# Patient Record
Sex: MALE | Race: BLACK OR AFRICAN AMERICAN | NOT HISPANIC OR LATINO | Employment: FULL TIME | RURAL
[De-identification: names, ages, dates, MRNs, and addresses within clinical notes are randomized per-mention and may not be internally consistent; named-entity substitution may affect disease eponyms.]

---

## 2020-03-19 ENCOUNTER — HISTORICAL (OUTPATIENT)
Dept: ADMINISTRATIVE | Facility: HOSPITAL | Age: 20
End: 2020-03-19

## 2021-03-26 ENCOUNTER — OFFICE VISIT (OUTPATIENT)
Dept: FAMILY MEDICINE | Facility: CLINIC | Age: 21
End: 2021-03-26
Payer: COMMERCIAL

## 2021-03-26 DIAGNOSIS — J30.1 SEASONAL ALLERGIC RHINITIS DUE TO POLLEN: ICD-10-CM

## 2021-03-26 DIAGNOSIS — Z13.220 SCREENING CHOLESTEROL LEVEL: Primary | ICD-10-CM

## 2021-03-26 DIAGNOSIS — I10 HYPERTENSION, UNSPECIFIED TYPE: ICD-10-CM

## 2021-03-26 PROCEDURE — 99213 OFFICE O/P EST LOW 20 MIN: CPT | Mod: GC,,, | Performed by: FAMILY MEDICINE

## 2021-03-26 PROCEDURE — 99213 PR OFFICE/OUTPT VISIT, EST, LEVL III, 20-29 MIN: ICD-10-PCS | Mod: GC,,, | Performed by: FAMILY MEDICINE

## 2021-03-26 RX ORDER — MONTELUKAST SODIUM 10 MG/1
10 TABLET ORAL NIGHTLY
Qty: 30 TABLET | Refills: 5 | Status: SHIPPED | OUTPATIENT
Start: 2021-03-26 | End: 2021-09-22

## 2021-03-26 RX ORDER — HYDROCHLOROTHIAZIDE 12.5 MG/1
12.5 TABLET ORAL 2 TIMES DAILY WITH MEALS
COMMUNITY
End: 2021-03-26

## 2021-03-26 RX ORDER — HYDROCHLOROTHIAZIDE 12.5 MG/1
12.5 TABLET ORAL 2 TIMES DAILY WITH MEALS
Qty: 60 TABLET | Refills: 6 | Status: SHIPPED | OUTPATIENT
Start: 2021-03-26 | End: 2022-09-12

## 2021-03-26 RX ORDER — MONTELUKAST SODIUM 10 MG/1
10 TABLET ORAL DAILY
COMMUNITY
End: 2021-03-26

## 2021-03-26 RX ORDER — HYDROCHLOROTHIAZIDE 12.5 MG/1
12.5 TABLET ORAL 2 TIMES DAILY WITH MEALS
Qty: 60 TABLET | Refills: 6 | Status: SHIPPED | OUTPATIENT
Start: 2021-03-26 | End: 2021-03-26

## 2021-05-21 ENCOUNTER — TELEPHONE (OUTPATIENT)
Dept: FAMILY MEDICINE | Facility: CLINIC | Age: 21
End: 2021-05-21

## 2021-05-23 ENCOUNTER — TELEPHONE (OUTPATIENT)
Dept: FAMILY MEDICINE | Facility: CLINIC | Age: 21
End: 2021-05-23

## 2022-09-12 ENCOUNTER — OFFICE VISIT (OUTPATIENT)
Dept: FAMILY MEDICINE | Facility: CLINIC | Age: 22
End: 2022-09-12

## 2022-09-12 VITALS
OXYGEN SATURATION: 98 % | WEIGHT: 225.81 LBS | HEART RATE: 81 BPM | RESPIRATION RATE: 20 BRPM | TEMPERATURE: 98 F | HEIGHT: 67 IN | BODY MASS INDEX: 35.44 KG/M2 | DIASTOLIC BLOOD PRESSURE: 128 MMHG | SYSTOLIC BLOOD PRESSURE: 167 MMHG

## 2022-09-12 DIAGNOSIS — Z20.2 POSSIBLE EXPOSURE TO STD: Primary | ICD-10-CM

## 2022-09-12 DIAGNOSIS — I10 HYPERTENSION, UNSPECIFIED TYPE: ICD-10-CM

## 2022-09-12 LAB
HIV 1+O+2 AB SERPL QL: NORMAL
SYPHILIS AB INTERPRETATION: REACTIVE

## 2022-09-12 PROCEDURE — 87389 HIV 1 / 2 ANTIBODY: ICD-10-PCS | Mod: ,,, | Performed by: CLINICAL MEDICAL LABORATORY

## 2022-09-12 PROCEDURE — 99214 PR OFFICE/OUTPT VISIT, EST, LEVL IV, 30-39 MIN: ICD-10-PCS | Mod: GC,,, | Performed by: FAMILY MEDICINE

## 2022-09-12 PROCEDURE — 87389 HIV-1 AG W/HIV-1&-2 AB AG IA: CPT | Mod: ,,, | Performed by: CLINICAL MEDICAL LABORATORY

## 2022-09-12 PROCEDURE — 86694 HERPES SIMPLEX 1 & 2 IGM: ICD-10-PCS | Mod: ,,, | Performed by: CLINICAL MEDICAL LABORATORY

## 2022-09-12 PROCEDURE — 86592 SYPHILIS TEST NON-TREP QUAL: CPT | Mod: ,,, | Performed by: CLINICAL MEDICAL LABORATORY

## 2022-09-12 PROCEDURE — 87591 N.GONORRHOEAE DNA AMP PROB: CPT | Mod: ,,, | Performed by: CLINICAL MEDICAL LABORATORY

## 2022-09-12 PROCEDURE — 87491 CHLMYD TRACH DNA AMP PROBE: CPT | Mod: ,,, | Performed by: CLINICAL MEDICAL LABORATORY

## 2022-09-12 PROCEDURE — 86694 HERPES SIMPLEX NES ANTBDY: CPT | Mod: ,,, | Performed by: CLINICAL MEDICAL LABORATORY

## 2022-09-12 PROCEDURE — 86780 TREPONEMA PALLIDUM (SYPHILIS) ANTIBODY: ICD-10-PCS | Mod: ,,, | Performed by: CLINICAL MEDICAL LABORATORY

## 2022-09-12 PROCEDURE — 87491 CHLAMYDIA/GONORRHOEAE(GC), PCR: ICD-10-PCS | Mod: ,,, | Performed by: CLINICAL MEDICAL LABORATORY

## 2022-09-12 PROCEDURE — 86592 RPR: ICD-10-PCS | Mod: ,,, | Performed by: CLINICAL MEDICAL LABORATORY

## 2022-09-12 PROCEDURE — 86780 TREPONEMA PALLIDUM: CPT | Mod: ,,, | Performed by: CLINICAL MEDICAL LABORATORY

## 2022-09-12 PROCEDURE — 87591 CHLAMYDIA/GONORRHOEAE(GC), PCR: ICD-10-PCS | Mod: ,,, | Performed by: CLINICAL MEDICAL LABORATORY

## 2022-09-12 PROCEDURE — 99214 OFFICE O/P EST MOD 30 MIN: CPT | Mod: GC,,, | Performed by: FAMILY MEDICINE

## 2022-09-12 RX ORDER — HYDROCHLOROTHIAZIDE 12.5 MG/1
12.5 TABLET ORAL DAILY
Qty: 30 TABLET | Refills: 11 | Status: SHIPPED | OUTPATIENT
Start: 2022-09-12 | End: 2023-03-27 | Stop reason: SDUPTHER

## 2022-09-12 NOTE — PROGRESS NOTES
Subjective:       Patient ID: Jonatan Ibarra is a 21 y.o. male.    Chief Complaint: Establish Care (Title X: STD Check)    This is a 21 year old male with Hx of HTN who presents today with penile shaft sores and concern for STD.  Today patient requested a Title X STD check. Patient states he has noticed some  painless small sores on his penis for the last few days to a week and he was concerned about a possible STD. PT reports he only has 1 female partner and they never had any STD issues. He also believes it could be from the new soap that he had been using over the past week because it possibly was causing some itching. Patient also noted to have elevated blood pressure and states he has not taken his blood pressure in a few months because he could not afford his medication.    STD test collected today Chlamydia/GC PCR, HIV 1/2 Ag/AB, HSV 1 and 2, Syphilis Antibody with RPR will call patient with results    HTN -re-ordered patients HCTZ and sent to [facility]. Without insurance medication is 4 dollars. Patient instructed to  medication today and start taking today.      Current Outpatient Medications:     hydroCHLOROthiazide (HYDRODIURIL) 12.5 MG Tab, Take 1 tablet (12.5 mg total) by mouth once daily., Disp: 30 tablet, Rfl: 11    Review of patient's allergies indicates:   Allergen Reactions    Shellfish containing products Rash and Shortness Of Breath       History reviewed. No pertinent past medical history.    History reviewed. No pertinent surgical history.    History reviewed. No pertinent family history.    Social History     Tobacco Use    Smoking status: Every Day     Types: Cigarettes    Smokeless tobacco: Never   Substance Use Topics    Alcohol use: Yes     Comment: occasionally       Review of Systems   Genitourinary:  Positive for genital sores.   All other systems reviewed and are negative.      Current Medications:   Medication List with Changes/Refills   New Medications     "HYDROCHLOROTHIAZIDE (HYDRODIURIL) 12.5 MG TAB    Take 1 tablet (12.5 mg total) by mouth once daily.       Start Date: 9/12/2022 End Date: 9/12/2023   Discontinued Medications    HYDROCHLOROTHIAZIDE (HYDRODIURIL) 12.5 MG TAB    Take 1 tablet (12.5 mg total) by mouth 2 (two) times daily with meals.       Start Date: 3/26/2021 End Date: 9/12/2022            Objective:        Vitals:    09/12/22 1112   BP: (!) 167/128   BP Location: Right arm   Patient Position: Sitting   BP Method: Medium (Automatic)   Pulse: 81   Resp: 20   Temp: 98.4 °F (36.9 °C)   TempSrc: Oral   SpO2: 98%   Weight: 102.4 kg (225 lb 12.8 oz)   Height: 5' 7" (1.702 m)       Physical Exam  Constitutional:       Appearance: Normal appearance.   HENT:      Head: Normocephalic.      Right Ear: Tympanic membrane normal.      Left Ear: Tympanic membrane normal.      Nose: Nose normal.      Mouth/Throat:      Mouth: Mucous membranes are moist.      Pharynx: Oropharynx is clear.   Eyes:      Conjunctiva/sclera: Conjunctivae normal.      Pupils: Pupils are equal, round, and reactive to light.   Cardiovascular:      Rate and Rhythm: Normal rate and regular rhythm.      Pulses: Normal pulses.      Heart sounds: Normal heart sounds.   Pulmonary:      Effort: Pulmonary effort is normal.      Breath sounds: Normal breath sounds.   Abdominal:      General: Abdomen is flat. Bowel sounds are normal.   Musculoskeletal:         General: Normal range of motion.      Cervical back: Normal range of motion.   Skin:     General: Skin is warm and dry.      Capillary Refill: Capillary refill takes less than 2 seconds.   Neurological:      General: No focal deficit present.      Mental Status: He is alert.   Psychiatric:         Mood and Affect: Mood normal.           No results found for: WBC, HGB, HCT, PLT, CHOL, TRIG, HDL, LDLDIRECT, ALT, AST, NA, K, CL, CREATININE, BUN, CO2, TSH, PSA, INR, GLUF, HGBA1C, MICROALBUR   Assessment:       1. Possible exposure to STD    2. " Hypertension, unspecified type          Plan:         Problem List Items Addressed This Visit          Cardiac/Vascular    Hypertension     -HydroCHLOROthiazide 12.5mg daily ordered. Prescription sent to [facility] system for affordable option.  -Patient told to monitor blood pressure daily and nightly                ID    Possible exposure to STD - Primary     -STD Test: Chlamydia/GC PCR, HIV 1/2 Ag/Ab, HSV 1 and 2, Syphilis Antibody with RPR    -Will call patient with results and treatment plan if needed.         Relevant Orders    Chlamydia/GC, PCR    HIV 1/2 Ag/Ab (4th Gen)    HSV 1 & 2, IgM    Syphilis Antibody with reflex to RPR         No follow-ups on file.    Macario Iraheta MD     Instructed patient that if symptoms fail to improve or worsen patient should seek immediate medical attention or report to the nearest emergency department. Patient expressed verbal agreement and understanding to this plan of care.

## 2022-09-12 NOTE — ASSESSMENT & PLAN NOTE
-STD Test: Chlamydia/GC PCR, HIV 1/2 Ag/Ab, HSV 1 and 2, Syphilis Antibody with RPR    -Will call patient with results and treatment plan if needed.

## 2022-09-12 NOTE — ASSESSMENT & PLAN NOTE
-HydroCHLOROthiazide 12.5mg daily ordered. Prescription sent to [facility] for affordable option.  -Patient told to monitor blood pressure daily and nightly

## 2022-09-13 LAB
CHLAMYDIA BY PCR: NEGATIVE
N. GONORRHOEAE (GC) BY PCR: NEGATIVE
RPR SER-TITR: NORMAL {TITER}

## 2022-09-14 ENCOUNTER — OFFICE VISIT (OUTPATIENT)
Dept: FAMILY MEDICINE | Facility: CLINIC | Age: 22
End: 2022-09-14

## 2022-09-14 VITALS
SYSTOLIC BLOOD PRESSURE: 161 MMHG | DIASTOLIC BLOOD PRESSURE: 108 MMHG | WEIGHT: 225 LBS | HEART RATE: 86 BPM | TEMPERATURE: 98 F | RESPIRATION RATE: 20 BRPM | BODY MASS INDEX: 35.31 KG/M2 | HEIGHT: 67 IN | OXYGEN SATURATION: 98 %

## 2022-09-14 DIAGNOSIS — N48.9 PENILE LESION: ICD-10-CM

## 2022-09-14 DIAGNOSIS — A53.9 SYPHILIS: Primary | ICD-10-CM

## 2022-09-14 DIAGNOSIS — Z20.2 POSSIBLE EXPOSURE TO STD: ICD-10-CM

## 2022-09-14 DIAGNOSIS — A53.9 SYPHILIS (ACQUIRED): ICD-10-CM

## 2022-09-14 LAB — HSV IGM SER QL IA: NEGATIVE

## 2022-09-14 PROCEDURE — 96372 PR INJECTION,THERAP/PROPH/DIAG2ST, IM OR SUBCUT: ICD-10-PCS | Mod: GC,,, | Performed by: FAMILY MEDICINE

## 2022-09-14 PROCEDURE — 99213 OFFICE O/P EST LOW 20 MIN: CPT | Mod: 25,GC,, | Performed by: FAMILY MEDICINE

## 2022-09-14 PROCEDURE — 96372 THER/PROPH/DIAG INJ SC/IM: CPT | Mod: GC,,, | Performed by: FAMILY MEDICINE

## 2022-09-14 PROCEDURE — 99213 PR OFFICE/OUTPT VISIT, EST, LEVL III, 20-29 MIN: ICD-10-PCS | Mod: 25,GC,, | Performed by: FAMILY MEDICINE

## 2022-09-14 NOTE — PROGRESS NOTES
Subjective:       Patient ID: Zacolkeri Ibarra is a 21 y.o. male.    Chief Complaint: Results (Title X results)    Patient came in to discuss his positive syphilis results. Had a discussion about contacting any partners who may have been exposed to his sore. He stated that it had only been a week since the encounter with his girlfriend who is currently being tested and treated at a different facility. Discussed safe sex practices and the results of his other negative STI tests. He seemed very receptive and that he understood the results. Also advised patient to get orthotics for his pes planus.   Review of Systems   Constitutional:  Negative for activity change, appetite change, chills and fever.   HENT:  Negative for dental problem, drooling, ear pain, sneezing and trouble swallowing.    Eyes:  Negative for visual disturbance and eye dryness.   Respiratory:  Negative for cough and shortness of breath.    Cardiovascular:  Negative for chest pain and leg swelling.   Gastrointestinal:  Negative for abdominal distention, abdominal pain, constipation, diarrhea, nausea and vomiting.   Genitourinary:  Positive for genital sores and penile pain. Negative for difficulty urinating, hematuria and testicular pain.   Musculoskeletal:  Negative for arthralgias, gait problem, joint swelling, myalgias, neck pain and joint deformity.   Allergic/Immunologic: Negative for environmental allergies.   Neurological:  Negative for light-headedness.   Psychiatric/Behavioral:  Negative for agitation, behavioral problems, confusion, decreased concentration, dysphoric mood and hallucinations.        Objective:      Physical Exam  Constitutional:       General: He is not in acute distress.     Appearance: Normal appearance. He is obese.   HENT:      Head: Normocephalic and atraumatic.      Right Ear: External ear normal.      Left Ear: External ear normal.      Nose: Nose normal.      Mouth/Throat:      Mouth: Mucous membranes are  moist.      Pharynx: Oropharynx is clear.   Eyes:      General:         Right eye: No discharge.         Left eye: Discharge present.     Extraocular Movements: Extraocular movements intact.   Cardiovascular:      Rate and Rhythm: Normal rate and regular rhythm.      Pulses: Normal pulses.      Heart sounds: Normal heart sounds. No murmur heard.  Pulmonary:      Effort: Pulmonary effort is normal.      Breath sounds: Normal breath sounds. No stridor. No wheezing.   Abdominal:      Palpations: Abdomen is soft.      Tenderness: There is no guarding.   Genitourinary:     Penis: Lesions present.    Musculoskeletal:         General: No swelling.   Skin:     Coloration: Skin is not jaundiced or pale.   Neurological:      General: No focal deficit present.      Mental Status: He is alert.   Psychiatric:         Mood and Affect: Mood normal.         Thought Content: Thought content normal.       Assessment:       Problem List Items Addressed This Visit    None      Plan:       Administered 2.4 million unit Penicillin G IM  Follow up in 3 months or sooner if other symptoms arise

## 2022-09-20 PROBLEM — N48.9 PENILE LESION: Status: ACTIVE | Noted: 2022-09-20

## 2022-09-20 NOTE — PROGRESS NOTES
I have reviewed the notes, assessments, and/or procedures performed by DR REYES, I concur with his documentation of Jonatan Ibarra. AND ADDED SOME DIAGNOSTIC CODES TO HIS A/P for completeness.

## 2023-03-27 ENCOUNTER — OFFICE VISIT (OUTPATIENT)
Dept: FAMILY MEDICINE | Facility: CLINIC | Age: 23
End: 2023-03-27

## 2023-03-27 VITALS
HEART RATE: 75 BPM | WEIGHT: 234 LBS | HEIGHT: 67 IN | RESPIRATION RATE: 19 BRPM | SYSTOLIC BLOOD PRESSURE: 160 MMHG | TEMPERATURE: 98 F | OXYGEN SATURATION: 98 % | BODY MASS INDEX: 36.73 KG/M2 | DIASTOLIC BLOOD PRESSURE: 119 MMHG

## 2023-03-27 DIAGNOSIS — R36.9 PENILE DISCHARGE: Primary | ICD-10-CM

## 2023-03-27 DIAGNOSIS — I10 HYPERTENSION, UNSPECIFIED TYPE: ICD-10-CM

## 2023-03-27 LAB
BACTERIA #/AREA URNS HPF: ABNORMAL /HPF
BILIRUB UR QL STRIP: NEGATIVE
CLARITY UR: ABNORMAL
COLOR UR: ABNORMAL
GLUCOSE UR STRIP-MCNC: NORMAL MG/DL
KETONES UR STRIP-SCNC: NEGATIVE MG/DL
LEUKOCYTE ESTERASE UR QL STRIP: ABNORMAL
MUCOUS, UA: ABNORMAL /LPF
NITRITE UR QL STRIP: NEGATIVE
PH UR STRIP: 6 PH UNITS
PROT UR QL STRIP: 10
RBC # UR STRIP: ABNORMAL /UL
RBC #/AREA URNS HPF: 52 /HPF
SP GR UR STRIP: 1.02
SQUAMOUS #/AREA URNS LPF: ABNORMAL /HPF
UROBILINOGEN UR STRIP-ACNC: NORMAL MG/DL
WBC #/AREA URNS HPF: 81 /HPF
WBC CLUMPS, UA: ABNORMAL /HPF

## 2023-03-27 PROCEDURE — 96372 THER/PROPH/DIAG INJ SC/IM: CPT | Mod: GC,,, | Performed by: FAMILY MEDICINE

## 2023-03-27 PROCEDURE — 99214 OFFICE O/P EST MOD 30 MIN: CPT | Mod: GC,,, | Performed by: FAMILY MEDICINE

## 2023-03-27 PROCEDURE — 87591 N.GONORRHOEAE DNA AMP PROB: CPT | Mod: ,,, | Performed by: CLINICAL MEDICAL LABORATORY

## 2023-03-27 PROCEDURE — 87086 CULTURE, URINE: ICD-10-PCS | Mod: ,,, | Performed by: CLINICAL MEDICAL LABORATORY

## 2023-03-27 PROCEDURE — 87491 CHLMYD TRACH DNA AMP PROBE: CPT | Mod: ,,, | Performed by: CLINICAL MEDICAL LABORATORY

## 2023-03-27 PROCEDURE — 81001 URINALYSIS AUTO W/SCOPE: CPT | Mod: ,,, | Performed by: CLINICAL MEDICAL LABORATORY

## 2023-03-27 PROCEDURE — 99214 PR OFFICE/OUTPT VISIT, EST, LEVL IV, 30-39 MIN: ICD-10-PCS | Mod: GC,,, | Performed by: FAMILY MEDICINE

## 2023-03-27 PROCEDURE — 87591 CHLAMYDIA/GONORRHOEAE(GC), PCR: ICD-10-PCS | Mod: ,,, | Performed by: CLINICAL MEDICAL LABORATORY

## 2023-03-27 PROCEDURE — 81001 URINALYSIS, REFLEX TO URINE CULTURE: ICD-10-PCS | Mod: ,,, | Performed by: CLINICAL MEDICAL LABORATORY

## 2023-03-27 PROCEDURE — 96372 PR INJECTION,THERAP/PROPH/DIAG2ST, IM OR SUBCUT: ICD-10-PCS | Mod: GC,,, | Performed by: FAMILY MEDICINE

## 2023-03-27 PROCEDURE — 87491 CHLAMYDIA/GONORRHOEAE(GC), PCR: ICD-10-PCS | Mod: ,,, | Performed by: CLINICAL MEDICAL LABORATORY

## 2023-03-27 PROCEDURE — 87086 URINE CULTURE/COLONY COUNT: CPT | Mod: ,,, | Performed by: CLINICAL MEDICAL LABORATORY

## 2023-03-27 RX ORDER — CEFTRIAXONE 1 G/1
1 INJECTION, POWDER, FOR SOLUTION INTRAMUSCULAR; INTRAVENOUS
Status: COMPLETED | OUTPATIENT
Start: 2023-03-27 | End: 2023-03-27

## 2023-03-27 RX ORDER — CEFTRIAXONE 1 G/1
1 INJECTION, POWDER, FOR SOLUTION INTRAMUSCULAR; INTRAVENOUS
Status: DISCONTINUED | OUTPATIENT
Start: 2023-03-27 | End: 2023-03-27

## 2023-03-27 RX ORDER — HYDROCHLOROTHIAZIDE 12.5 MG/1
12.5 TABLET ORAL DAILY
Qty: 30 TABLET | Refills: 2 | Status: SHIPPED | OUTPATIENT
Start: 2023-03-27 | End: 2023-12-27 | Stop reason: SDUPTHER

## 2023-03-27 RX ORDER — DOXYCYCLINE HYCLATE 100 MG
100 TABLET ORAL 2 TIMES DAILY
Qty: 14 TABLET | Refills: 0 | Status: SHIPPED | OUTPATIENT
Start: 2023-03-27 | End: 2023-04-03

## 2023-03-27 RX ORDER — CEFTRIAXONE 1 G/1
0.5 INJECTION, POWDER, FOR SOLUTION INTRAMUSCULAR; INTRAVENOUS
Status: DISCONTINUED | OUTPATIENT
Start: 2023-03-27 | End: 2023-03-27

## 2023-03-27 RX ADMIN — CEFTRIAXONE 1 G: 1 INJECTION, POWDER, FOR SOLUTION INTRAMUSCULAR; INTRAVENOUS at 11:03

## 2023-03-27 NOTE — LETTER
March 27, 2023      [facility]  [address]  [place] [address]  Phone: [phone]  Fax: [phone]       Patient: Jonatan Ibarra   YOB: 2000  Date of Visit: 03/27/2023    To Whom It May Concern:    Alma Ibarra  was at [facility] on 03/27/2023. The patient may return to work/school on 3/27/23 with no restrictions. If you have any questions or concerns, or if I can be of further assistance, please do not hesitate to contact me.    Sincerely,    Monroe Salgado MD

## 2023-03-28 LAB
CHLAMYDIA BY PCR: NEGATIVE
N. GONORRHOEAE (GC) BY PCR: NEGATIVE

## 2023-03-29 LAB — UA COMPLETE W REFLEX CULTURE PNL UR: NO GROWTH

## 2023-03-30 NOTE — PROGRESS NOTES
"      Monroe Salgado, DO  [address], [place] [address]  Phone: [phone]     Subjective     Name: Jonatan Ibarra   YOB: 2000 (22 y.o.)  MRN: [id number]  Visit Date: 03/27/2023   Chief Complaint: STD SCREENING, Penile Discharge, and Dysuria        HISTORY OF PRESENT ILLNESS:  Pt is a 21 yo male presenting today with a cc of "UTI-like symptoms." Pt states that he began experiencing pain with urination and some blood in his urine for approximately 3 weeks. He does endorse some discharge that has occurred a couple times as well. He denies any new sexual partners. He was treated prior for syphillis. Will check GC, and UA today. Will treat empirically with rocephin and doxy as patient is a  and states that he wont be back in town for a couple weeks.       PAST MEDICAL HISTORY:  Significant Diagnoses - Patient  has no past medical history on file.  Medications - Patient has a current medication list which includes the following long-term medication(s): hydrochlorothiazide.   Allergies - Patient is allergic to shellfish containing products.  Surgeries - Patient  has no past surgical history on file.  Family History - Patient family history is not on file.      SOCIAL HISTORY:  Tobacco - Patient  reports that he has been smoking vaping w/o nicotine. He has never used smokeless tobacco.   Alcohol - Patient  reports current alcohol use.   Recreational Drugs - Patient  reports that he does not currently use drugs.       Review of Systems   Constitutional:  Negative for activity change, appetite change, chills and fever.   HENT:  Negative for dental problem, drooling, ear pain, sneezing and trouble swallowing.    Eyes:  Negative for visual disturbance and eye dryness.   Respiratory:  Negative for cough and shortness of breath.    Cardiovascular:  Negative for chest pain and leg swelling.   Gastrointestinal:  Negative for abdominal distention, abdominal pain, constipation, diarrhea, " "nausea and vomiting.   Genitourinary:  Positive for discharge, dysuria and urgency. Negative for difficulty urinating, genital sores, hematuria, penile pain and testicular pain.   Musculoskeletal:  Negative for arthralgias, gait problem, joint swelling, myalgias, neck pain and joint deformity.   Allergic/Immunologic: Negative for environmental allergies.   Neurological:  Negative for light-headedness.   Psychiatric/Behavioral:  Negative for agitation, behavioral problems, confusion, decreased concentration, dysphoric mood and hallucinations.         History reviewed. No pertinent past medical history.     Review of patient's allergies indicates:   Allergen Reactions    Shellfish containing products Rash and Shortness Of Breath        History reviewed. No pertinent surgical history.     History reviewed. No pertinent family history.    Current Outpatient Medications   Medication Instructions    doxycycline (VIBRA-TABS) 100 mg, Oral, 2 times daily    hydroCHLOROthiazide (HYDRODIURIL) 12.5 mg, Oral, Daily        Objective     BP (!) 160/119   Pulse 75   Temp 98.2 °F (36.8 °C) (Temporal)   Resp 19   Ht 5' 7" (1.702 m)   Wt 106.1 kg (234 lb)   SpO2 98%   BMI 36.65 kg/m²     Physical Exam  Vitals and nursing note reviewed.   Constitutional:       General: He is not in acute distress.     Appearance: Normal appearance. He is well-developed. He is not ill-appearing, toxic-appearing or diaphoretic.   HENT:      Head: Normocephalic and atraumatic.      Right Ear: External ear normal.      Left Ear: External ear normal.      Nose: Nose normal. No congestion or rhinorrhea.      Mouth/Throat:      Mouth: Mucous membranes are moist.      Pharynx: Oropharynx is clear.   Eyes:      General: No scleral icterus.     Extraocular Movements: Extraocular movements intact.      Conjunctiva/sclera: Conjunctivae normal.      Pupils: Pupils are equal, round, and reactive to light.   Cardiovascular:      Rate and Rhythm: Normal rate " and regular rhythm.      Pulses: Normal pulses.      Heart sounds: Normal heart sounds, S1 normal and S2 normal. No murmur heard.    No friction rub. No gallop. No S3 or S4 sounds.   Pulmonary:      Effort: Pulmonary effort is normal. No accessory muscle usage, prolonged expiration or respiratory distress.      Breath sounds: Normal breath sounds and air entry. No wheezing, rhonchi or rales.   Abdominal:      General: Abdomen is flat. Bowel sounds are normal. There is no distension.      Palpations: Abdomen is soft.      Tenderness: There is no abdominal tenderness. There is no guarding or rebound. Negative signs include Taylor's sign, Rovsing's sign and McBurney's sign.   Musculoskeletal:         General: Normal range of motion.      Cervical back: Normal range of motion and neck supple.      Left knee: Crepitus present. Tenderness present over the LCL.      Instability Tests: Anterior drawer test negative. Posterior drawer test negative. Lateral Trish test positive.      Right lower leg: No edema.      Left lower leg: No edema.   Skin:     General: Skin is warm and dry.      Coloration: Skin is not jaundiced.   Neurological:      General: No focal deficit present.      Mental Status: He is alert and oriented to person, place, and time.   Psychiatric:         Mood and Affect: Mood normal.         Behavior: Behavior normal. Behavior is cooperative.         Thought Content: Thought content normal.        All recently obtained labs have been reviewed and discussed in detail with the patient.   Assessment     1. Penile discharge    2. Hypertension, unspecified type         Plan     Problem List Items Addressed This Visit          Cardiac/Vascular    Hypertension    Relevant Medications    hydroCHLOROthiazide (HYDRODIURIL) 12.5 MG Tab     Other Visit Diagnoses       Penile discharge    -  Primary    Relevant Medications    doxycycline (VIBRA-TABS) 100 MG tablet    cefTRIAXone injection 1 g (Completed)    Other  Relevant Orders    Chlamydia/GC, PCR (Completed)    Urinalysis, Reflex to Urine Culture (Completed)    Urinalysis, Microscopic (Completed)    Urine culture (Completed)              All orders:  Orders Placed This Encounter    Chlamydia/GC, PCR    Urine culture    Urinalysis, Reflex to Urine Culture    Urinalysis, Microscopic    hydroCHLOROthiazide (HYDRODIURIL) 12.5 MG Tab    doxycycline (VIBRA-TABS) 100 MG tablet    cefTRIAXone injection 1 g          Follow up in about 1 month (around 4/27/2023), or if symptoms worsen or fail to improve.    Monroe Salgado,   [facility]

## 2023-12-27 ENCOUNTER — OFFICE VISIT (OUTPATIENT)
Dept: FAMILY MEDICINE | Facility: CLINIC | Age: 23
End: 2023-12-27
Payer: COMMERCIAL

## 2023-12-27 VITALS
SYSTOLIC BLOOD PRESSURE: 140 MMHG | TEMPERATURE: 98 F | WEIGHT: 249 LBS | OXYGEN SATURATION: 99 % | BODY MASS INDEX: 39.08 KG/M2 | DIASTOLIC BLOOD PRESSURE: 118 MMHG | HEART RATE: 77 BPM | HEIGHT: 67 IN | RESPIRATION RATE: 20 BRPM

## 2023-12-27 DIAGNOSIS — I10 HYPERTENSION, UNSPECIFIED TYPE: ICD-10-CM

## 2023-12-27 DIAGNOSIS — Z76.0 MEDICATION REFILL: ICD-10-CM

## 2023-12-27 DIAGNOSIS — I45.6 WOLFF-PARKINSON-WHITE SYNDROME: ICD-10-CM

## 2023-12-27 DIAGNOSIS — N48.9 PENILE LESION: ICD-10-CM

## 2023-12-27 DIAGNOSIS — Z72.51 HIGH RISK SEXUAL BEHAVIOR, UNSPECIFIED TYPE: Primary | ICD-10-CM

## 2023-12-27 DIAGNOSIS — Z20.2 POSSIBLE EXPOSURE TO STD: ICD-10-CM

## 2023-12-27 PROCEDURE — 87661 TRICHOMONAS VAGINALIS AMPLIF: CPT | Mod: ,,, | Performed by: CLINICAL MEDICAL LABORATORY

## 2023-12-27 PROCEDURE — 80074 ACUTE HEPATITIS PANEL: CPT | Mod: ,,, | Performed by: CLINICAL MEDICAL LABORATORY

## 2023-12-27 PROCEDURE — 86780 TREPONEMA PALLIDUM: CPT | Mod: ,,, | Performed by: CLINICAL MEDICAL LABORATORY

## 2023-12-27 PROCEDURE — 3080F PR MOST RECENT DIASTOLIC BLOOD PRESSURE >= 90 MM HG: ICD-10-PCS | Mod: ,,, | Performed by: FAMILY MEDICINE

## 2023-12-27 PROCEDURE — 87389 HIV-1 AG W/HIV-1&-2 AB AG IA: CPT | Mod: ,,, | Performed by: CLINICAL MEDICAL LABORATORY

## 2023-12-27 PROCEDURE — 86592 RPR: ICD-10-PCS | Mod: ,,, | Performed by: CLINICAL MEDICAL LABORATORY

## 2023-12-27 PROCEDURE — 3008F BODY MASS INDEX DOCD: CPT | Mod: ,,, | Performed by: FAMILY MEDICINE

## 2023-12-27 PROCEDURE — 3080F DIAST BP >= 90 MM HG: CPT | Mod: ,,, | Performed by: FAMILY MEDICINE

## 2023-12-27 PROCEDURE — 86780 TREPONEMA PALLIDUM (SYPHILIS) ANTIBODY: ICD-10-PCS | Mod: ,,, | Performed by: CLINICAL MEDICAL LABORATORY

## 2023-12-27 PROCEDURE — 85025 COMPLETE CBC W/AUTO DIFF WBC: CPT | Mod: ,,, | Performed by: CLINICAL MEDICAL LABORATORY

## 2023-12-27 PROCEDURE — 87591 CHLAMYDIA/GONORRHOEAE(GC), PCR: ICD-10-PCS | Mod: ,,, | Performed by: CLINICAL MEDICAL LABORATORY

## 2023-12-27 PROCEDURE — 87491 CHLAMYDIA/GONORRHOEAE(GC), PCR: ICD-10-PCS | Mod: ,,, | Performed by: CLINICAL MEDICAL LABORATORY

## 2023-12-27 PROCEDURE — 3077F SYST BP >= 140 MM HG: CPT | Mod: ,,, | Performed by: FAMILY MEDICINE

## 2023-12-27 PROCEDURE — 80053 COMPREHEN METABOLIC PANEL: CPT | Mod: ,,, | Performed by: CLINICAL MEDICAL LABORATORY

## 2023-12-27 PROCEDURE — 87661 TRICHOMONAS VAGINALIS BY PCR: ICD-10-PCS | Mod: ,,, | Performed by: CLINICAL MEDICAL LABORATORY

## 2023-12-27 PROCEDURE — 87389 HIV 1 / 2 ANTIBODY: ICD-10-PCS | Mod: ,,, | Performed by: CLINICAL MEDICAL LABORATORY

## 2023-12-27 PROCEDURE — 87491 CHLMYD TRACH DNA AMP PROBE: CPT | Mod: ,,, | Performed by: CLINICAL MEDICAL LABORATORY

## 2023-12-27 PROCEDURE — 86592 SYPHILIS TEST NON-TREP QUAL: CPT | Mod: ,,, | Performed by: CLINICAL MEDICAL LABORATORY

## 2023-12-27 PROCEDURE — 80053 COMPREHENSIVE METABOLIC PANEL: ICD-10-PCS | Mod: ,,, | Performed by: CLINICAL MEDICAL LABORATORY

## 2023-12-27 PROCEDURE — 3077F PR MOST RECENT SYSTOLIC BLOOD PRESSURE >= 140 MM HG: ICD-10-PCS | Mod: ,,, | Performed by: FAMILY MEDICINE

## 2023-12-27 PROCEDURE — 87591 N.GONORRHOEAE DNA AMP PROB: CPT | Mod: ,,, | Performed by: CLINICAL MEDICAL LABORATORY

## 2023-12-27 PROCEDURE — 3008F PR BODY MASS INDEX (BMI) DOCUMENTED: ICD-10-PCS | Mod: ,,, | Performed by: FAMILY MEDICINE

## 2023-12-27 PROCEDURE — 80074 HEPATITIS PANEL, ACUTE: ICD-10-PCS | Mod: ,,, | Performed by: CLINICAL MEDICAL LABORATORY

## 2023-12-27 PROCEDURE — 99214 OFFICE O/P EST MOD 30 MIN: CPT | Mod: GC,,, | Performed by: FAMILY MEDICINE

## 2023-12-27 PROCEDURE — 85025 CBC WITH DIFFERENTIAL: ICD-10-PCS | Mod: ,,, | Performed by: CLINICAL MEDICAL LABORATORY

## 2023-12-27 PROCEDURE — 99214 PR OFFICE/OUTPT VISIT, EST, LEVL IV, 30-39 MIN: ICD-10-PCS | Mod: GC,,, | Performed by: FAMILY MEDICINE

## 2023-12-27 RX ORDER — HYDROCHLOROTHIAZIDE 12.5 MG/1
12.5 TABLET ORAL DAILY
Qty: 30 TABLET | Refills: 2 | Status: SHIPPED | OUTPATIENT
Start: 2023-12-27 | End: 2024-03-26

## 2023-12-28 LAB
ALBUMIN SERPL BCP-MCNC: 4.2 G/DL (ref 3.5–5)
ALBUMIN/GLOB SERPL: 1 {RATIO}
ALP SERPL-CCNC: 91 U/L (ref 45–115)
ALT SERPL W P-5'-P-CCNC: 41 U/L (ref 16–61)
ANION GAP SERPL CALCULATED.3IONS-SCNC: 9 MMOL/L (ref 7–16)
AST SERPL W P-5'-P-CCNC: 22 U/L (ref 15–37)
BASOPHILS # BLD AUTO: 0.07 K/UL (ref 0–0.2)
BASOPHILS NFR BLD AUTO: 1.1 % (ref 0–1)
BILIRUB SERPL-MCNC: 0.5 MG/DL (ref ?–1.2)
BUN SERPL-MCNC: 10 MG/DL (ref 7–18)
BUN/CREAT SERPL: 7 (ref 6–20)
CALCIUM SERPL-MCNC: 9.8 MG/DL (ref 8.5–10.1)
CHLORIDE SERPL-SCNC: 105 MMOL/L (ref 98–107)
CO2 SERPL-SCNC: 30 MMOL/L (ref 21–32)
CREAT SERPL-MCNC: 1.5 MG/DL (ref 0.7–1.3)
DIFFERENTIAL METHOD BLD: ABNORMAL
EGFR (NO RACE VARIABLE) (RUSH/TITUS): 66 ML/MIN/1.73M2
EOSINOPHIL # BLD AUTO: 0.31 K/UL (ref 0–0.5)
EOSINOPHIL NFR BLD AUTO: 4.8 % (ref 1–4)
ERYTHROCYTE [DISTWIDTH] IN BLOOD BY AUTOMATED COUNT: 14 % (ref 11.5–14.5)
GLOBULIN SER-MCNC: 4.4 G/DL (ref 2–4)
GLUCOSE SERPL-MCNC: 93 MG/DL (ref 74–106)
HAV IGM SER QL: NORMAL
HBV CORE IGM SER QL: NORMAL
HBV SURFACE AG SERPL QL IA: NORMAL
HCT VFR BLD AUTO: 50.3 % (ref 40–54)
HCV AB SER QL: NORMAL
HGB BLD-MCNC: 16.4 G/DL (ref 13.5–18)
HIV 1+O+2 AB SERPL QL: NORMAL
IMM GRANULOCYTES # BLD AUTO: 0.04 K/UL (ref 0–0.04)
IMM GRANULOCYTES NFR BLD: 0.6 % (ref 0–0.4)
LYMPHOCYTES # BLD AUTO: 1.69 K/UL (ref 1–4.8)
LYMPHOCYTES NFR BLD AUTO: 26.1 % (ref 27–41)
MCH RBC QN AUTO: 24.6 PG (ref 27–31)
MCHC RBC AUTO-ENTMCNC: 32.6 G/DL (ref 32–36)
MCV RBC AUTO: 75.5 FL (ref 80–96)
MONOCYTES # BLD AUTO: 0.56 K/UL (ref 0–0.8)
MONOCYTES NFR BLD AUTO: 8.7 % (ref 2–6)
MPC BLD CALC-MCNC: 11.6 FL (ref 9.4–12.4)
NEUTROPHILS # BLD AUTO: 3.8 K/UL (ref 1.8–7.7)
NEUTROPHILS NFR BLD AUTO: 58.7 % (ref 53–65)
NRBC # BLD AUTO: 0 X10E3/UL
NRBC, AUTO (.00): 0 %
PLATELET # BLD AUTO: 241 K/UL (ref 150–400)
POTASSIUM SERPL-SCNC: 5 MMOL/L (ref 3.5–5.1)
PROT SERPL-MCNC: 8.6 G/DL (ref 6.4–8.2)
RBC # BLD AUTO: 6.66 M/UL (ref 4.6–6.2)
SODIUM SERPL-SCNC: 139 MMOL/L (ref 136–145)
SYPHILIS AB INTERPRETATION: REACTIVE
WBC # BLD AUTO: 6.47 K/UL (ref 4.5–11)

## 2023-12-28 NOTE — ASSESSMENT & PLAN NOTE
"Patient states that he had a non-painful penile lesion a few weeks ago "similar to the lesion when he was diagnosed with syphilis." Patient states that he no longer has the lesion.   "

## 2023-12-28 NOTE — ASSESSMENT & PLAN NOTE
Patient is requesting a full STD check today. Patient states that his ex-gf was intimate with a man who had sex with men and women. The other partner is currently in alf.     - Chlamydia/GC, PCR  - Trichomonas vaginalis by PCR  - HIV, RPR, Hepatitis panel

## 2023-12-28 NOTE — PROGRESS NOTES
Subjective     Patient ID: Jonatan Ibarra is a 23 y.o. male.    Chief Complaint: Medication Refill (Room 4 med refill (b/p) and wants all STD tests)    Patient is a 23 year old AA male who presents to the clinic for a medication refill and STD testing.     HTN  The patient has a history of HTN on HCTZ. He is not on K supplements. He is requesting a refill. The patient has a BP cuff at home that he uses occasionally.     STD  Patient is requesting a full STD check today. Patient states that his ex-gf was intimate with a man who had sex with men and women. The other partner is currently in intermediate. Patient has a history of being treated for syphilis in the past. The patient is currently sexually active with women, and is not using contraceptives.     WPW  The patient also has a history of Rodney-Parkinson-White that required 2 ablations when he was 3 years old and 14 years old that was done at [facility]. He denies a family history of WPW.     SOCIAL  The patient states that he recently stopped smoking Black and Mild cigarettes (1 pack a week). He has since transitioned to vaping a cartridge roughly every 2 weeks. The patient drinks alcohol. He denies illicit drug use.     Review of Systems   Constitutional:  Negative for chills, fatigue and fever.   HENT:  Negative for nasal congestion, sinus pressure/congestion and sore throat.    Respiratory:  Negative for cough, chest tightness, shortness of breath and wheezing.    Cardiovascular:  Negative for chest pain.   Gastrointestinal:  Negative for abdominal pain, diarrhea, nausea and vomiting.   Genitourinary:  Positive for genital sores. Negative for discharge, dysuria and penile pain.   Neurological:  Negative for dizziness, weakness and headaches.        Objective   Vitals:    12/27/23 1459   BP: (!) 140/118   Pulse: 77   Resp: 20   Temp: 98.1 °F (36.7 °C)       Physical Exam  Constitutional:       General: He is not in acute distress.     Appearance:  Normal appearance.   HENT:      Head: Normocephalic and atraumatic.      Right Ear: Tympanic membrane, ear canal and external ear normal.      Left Ear: Tympanic membrane, ear canal and external ear normal.      Nose: Nose normal.      Mouth/Throat:      Mouth: Mucous membranes are moist.   Eyes:      Extraocular Movements: Extraocular movements intact.      Conjunctiva/sclera: Conjunctivae normal.      Pupils: Pupils are equal, round, and reactive to light.   Cardiovascular:      Rate and Rhythm: Normal rate and regular rhythm.      Pulses: Normal pulses.      Heart sounds: Normal heart sounds.   Pulmonary:      Effort: Pulmonary effort is normal.      Breath sounds: Normal breath sounds.   Abdominal:      General: Bowel sounds are normal.      Palpations: Abdomen is soft.   Musculoskeletal:         General: Normal range of motion.      Cervical back: Normal range of motion.   Skin:     General: Skin is warm.   Neurological:      General: No focal deficit present.      Mental Status: He is alert and oriented to person, place, and time.   Psychiatric:         Mood and Affect: Mood normal.         Behavior: Behavior normal.         Thought Content: Thought content normal.         Judgment: Judgment normal.        Assessment and Plan     1. High risk sexual behavior, unspecified type  -     Chlamydia/GC, PCR  -     Trichomonas vaginalis by PCR; Future; Expected date: 12/27/2023  -     HIV 1/2 Ag/Ab (4th Gen); Future; Expected date: 12/27/2023  -     Syphilis Antibody with reflex to RPR; Future; Expected date: 12/27/2023  -     Hepatitis Panel, Acute; Future; Expected date: 12/27/2023  -     CBC Auto Differential; Future; Expected date: 12/27/2023  -     Comprehensive Metabolic Panel; Future; Expected date: 12/27/2023    2. Hypertension, unspecified type  Assessment & Plan:  - CMP pending due to no other bloodwork on file   - Refilled patient's HCTZ  - Patient has a BP cuff at home, was told to take his BP every day  "and log the values for the next week on the medication. Patient told to bring his log with him to his next appointment.   - Follow-up in 1 week      Orders:  -     hydroCHLOROthiazide (HYDRODIURIL) 12.5 MG Tab; Take 1 tablet (12.5 mg total) by mouth once daily.  Dispense: 30 tablet; Refill: 2  -     CBC Auto Differential; Future; Expected date: 12/27/2023    3. Medication refill    4. Possible exposure to STD  Assessment & Plan:  Patient is requesting a full STD check today. Patient states that his ex-gf was intimate with a man who had sex with men and women. The other partner is currently in shelter.     - Chlamydia/GC, PCR  - Trichomonas vaginalis by PCR  - HIV, RPR, Hepatitis panel      5. Penile lesion  Assessment & Plan:  Patient states that he had a non-painful penile lesion a few weeks ago "similar to the lesion when he was diagnosed with syphilis." Patient states that he no longer has the lesion.       6. Rodney-Parkinson-White syndrome  Overview:  Patient has had to have 2 cardiac ablations in the past - one when he was 3 years old and one at 14 years old. Ablations were [facility]. No known family history of WPW.                    No follow-ups on file.      "

## 2023-12-28 NOTE — ASSESSMENT & PLAN NOTE
- CMP pending due to no other bloodwork on file   - Refilled patient's HCTZ  - Patient has a BP cuff at home, was told to take his BP every day and log the values for the next week on the medication. Patient told to bring his log with him to his next appointment.   - Follow-up in 1 week

## 2023-12-29 LAB
CHLAMYDIA BY PCR: NEGATIVE
N. GONORRHOEAE (GC) BY PCR: NEGATIVE
RPR SER-TITR: NORMAL {TITER}
TRICHOMONAS NAT: POSITIVE

## 2024-01-11 NOTE — PROGRESS NOTES
Patient was positive for Trichomonas. Sent in Metronidazole 500mg BID for 7 days to his pharmacy. Discussed the results with the patient via telephone on 12/28/2023. Advised patient not to drink alcohol while on the medication, and not to be sexually active until after full antibiotic course. Patient has been treated for syphilis in the past and his Ab is reactive. RPR (acute infection) is non-reactive. HIV, Hepatitis panel, Chlamydia, and Gonorrhea negative. Will need to follow-up regarding renal function (Creatinine at 1.50) at future appointment. Discussed the need to follow renal function in the future.

## 2025-05-17 ENCOUNTER — OFFICE VISIT (OUTPATIENT)
Dept: FAMILY MEDICINE | Facility: CLINIC | Age: 25
End: 2025-05-17

## 2025-05-17 VITALS
TEMPERATURE: 98 F | BODY MASS INDEX: 37.03 KG/M2 | OXYGEN SATURATION: 98 % | SYSTOLIC BLOOD PRESSURE: 150 MMHG | WEIGHT: 250 LBS | HEART RATE: 77 BPM | DIASTOLIC BLOOD PRESSURE: 98 MMHG | HEIGHT: 69 IN | RESPIRATION RATE: 18 BRPM

## 2025-05-17 DIAGNOSIS — Z13.1 SCREENING FOR DIABETES MELLITUS: ICD-10-CM

## 2025-05-17 DIAGNOSIS — M79.609 PARESTHESIA AND PAIN OF LEFT EXTREMITY: ICD-10-CM

## 2025-05-17 DIAGNOSIS — F32.A DEPRESSION, UNSPECIFIED DEPRESSION TYPE: ICD-10-CM

## 2025-05-17 DIAGNOSIS — I10 HYPERTENSION, UNSPECIFIED TYPE: Primary | ICD-10-CM

## 2025-05-17 DIAGNOSIS — I45.6 WOLFF-PARKINSON-WHITE SYNDROME: ICD-10-CM

## 2025-05-17 DIAGNOSIS — Z13.220 SCREENING FOR LIPID DISORDERS: ICD-10-CM

## 2025-05-17 DIAGNOSIS — F41.1 GENERALIZED ANXIETY DISORDER: ICD-10-CM

## 2025-05-17 DIAGNOSIS — R20.2 PARESTHESIA AND PAIN OF LEFT EXTREMITY: ICD-10-CM

## 2025-05-17 LAB
ANION GAP SERPL CALCULATED.3IONS-SCNC: 14 MMOL/L (ref 7–16)
ANISOCYTOSIS BLD QL SMEAR: ABNORMAL
BASOPHILS # BLD AUTO: 0.1 K/UL (ref 0–0.2)
BASOPHILS NFR BLD AUTO: 1.6 % (ref 0–1)
BUN SERPL-MCNC: 14 MG/DL (ref 9–21)
BUN/CREAT SERPL: 10 (ref 6–20)
CALCIUM SERPL-MCNC: 9.6 MG/DL (ref 8.4–10.2)
CHLORIDE SERPL-SCNC: 102 MMOL/L (ref 98–107)
CHOLEST SERPL-MCNC: 153 MG/DL
CHOLEST/HDLC SERPL: 4.5 {RATIO}
CO2 SERPL-SCNC: 24 MMOL/L (ref 22–29)
CREAT SERPL-MCNC: 1.44 MG/DL (ref 0.72–1.25)
DIFFERENTIAL METHOD BLD: ABNORMAL
EGFR (NO RACE VARIABLE) (RUSH/TITUS): 69 ML/MIN/1.73M2
EOSINOPHIL # BLD AUTO: 0.54 K/UL (ref 0–0.5)
EOSINOPHIL NFR BLD AUTO: 8.7 % (ref 1–4)
ERYTHROCYTE [DISTWIDTH] IN BLOOD BY AUTOMATED COUNT: 13.1 % (ref 11.5–14.5)
EST. AVERAGE GLUCOSE BLD GHB EST-MCNC: 111 MG/DL
GLUCOSE SERPL-MCNC: 79 MG/DL (ref 74–100)
HBA1C MFR BLD HPLC: 5.5 %
HCT VFR BLD AUTO: 49.5 % (ref 40–54)
HDLC SERPL-MCNC: 34 MG/DL (ref 35–60)
HGB BLD-MCNC: 16.4 G/DL (ref 13.5–18)
IMM GRANULOCYTES # BLD AUTO: 0.01 K/UL (ref 0–0.04)
IMM GRANULOCYTES NFR BLD: 0.2 % (ref 0–0.4)
LDLC SERPL CALC-MCNC: 106 MG/DL
LDLC/HDLC SERPL: 3.1 {RATIO}
LYMPHOCYTES # BLD AUTO: 1.42 K/UL (ref 1–4.8)
LYMPHOCYTES NFR BLD AUTO: 22.9 % (ref 27–41)
MCH RBC QN AUTO: 24.7 PG (ref 27–31)
MCHC RBC AUTO-ENTMCNC: 33.1 G/DL (ref 32–36)
MCV RBC AUTO: 74.5 FL (ref 80–96)
MICROCYTES BLD QL SMEAR: ABNORMAL
MONOCYTES # BLD AUTO: 0.48 K/UL (ref 0–0.8)
MONOCYTES NFR BLD AUTO: 7.8 % (ref 2–6)
MPC BLD CALC-MCNC: 11.3 FL (ref 9.4–12.4)
NEUTROPHILS # BLD AUTO: 3.64 K/UL (ref 1.8–7.7)
NEUTROPHILS NFR BLD AUTO: 58.8 % (ref 53–65)
NONHDLC SERPL-MCNC: 119 MG/DL
NRBC # BLD AUTO: 0 X10E3/UL
NRBC, AUTO (.00): 0 %
PLATELET # BLD AUTO: 239 K/UL (ref 150–400)
PLATELET MORPHOLOGY: ABNORMAL
POTASSIUM SERPL-SCNC: 4.3 MMOL/L (ref 3.5–5.1)
RBC # BLD AUTO: 6.64 M/UL (ref 4.6–6.2)
SODIUM SERPL-SCNC: 136 MMOL/L (ref 136–145)
TRIGL SERPL-MCNC: 67 MG/DL (ref 34–140)
VLDLC SERPL-MCNC: 13 MG/DL
WBC # BLD AUTO: 6.19 K/UL (ref 4.5–11)

## 2025-05-17 PROCEDURE — 99051 MED SERV EVE/WKEND/HOLIDAY: CPT | Mod: ,,, | Performed by: NURSE PRACTITIONER

## 2025-05-17 PROCEDURE — 99213 OFFICE O/P EST LOW 20 MIN: CPT | Mod: ,,, | Performed by: NURSE PRACTITIONER

## 2025-05-17 PROCEDURE — 80061 LIPID PANEL: CPT | Mod: ,,, | Performed by: CLINICAL MEDICAL LABORATORY

## 2025-05-17 PROCEDURE — 80048 BASIC METABOLIC PNL TOTAL CA: CPT | Mod: ,,, | Performed by: CLINICAL MEDICAL LABORATORY

## 2025-05-17 PROCEDURE — 85025 COMPLETE CBC W/AUTO DIFF WBC: CPT | Mod: ,,, | Performed by: CLINICAL MEDICAL LABORATORY

## 2025-05-17 PROCEDURE — 83036 HEMOGLOBIN GLYCOSYLATED A1C: CPT | Mod: ,,, | Performed by: CLINICAL MEDICAL LABORATORY

## 2025-05-17 RX ORDER — AMLODIPINE BESYLATE 5 MG/1
5 TABLET ORAL DAILY
Qty: 30 TABLET | Refills: 0 | Status: SHIPPED | OUTPATIENT
Start: 2025-05-17 | End: 2025-05-17

## 2025-05-17 RX ORDER — FLUOXETINE 10 MG/1
10 CAPSULE ORAL DAILY
Qty: 30 CAPSULE | Refills: 0 | Status: SHIPPED | OUTPATIENT
Start: 2025-05-17 | End: 2025-05-17

## 2025-05-17 RX ORDER — AMLODIPINE BESYLATE 5 MG/1
5 TABLET ORAL DAILY
Qty: 30 TABLET | Refills: 0 | Status: SHIPPED | OUTPATIENT
Start: 2025-05-17 | End: 2025-06-16

## 2025-05-17 RX ORDER — FLUOXETINE 10 MG/1
10 CAPSULE ORAL DAILY
Qty: 30 CAPSULE | Refills: 0 | Status: SHIPPED | OUTPATIENT
Start: 2025-05-17 | End: 2025-06-16

## 2025-05-17 NOTE — PROGRESS NOTES
BECK Hampton   [facility] [address] [place] [address]  [phone]      PATIENT NAME: Jonatan Ibarra  : 2000  DATE: 25  MRN: [id number]      Billing Provider: BECK Hampton  Level of Service: MT OFFICE/OUTPT VISIT, EST, LEVL III, 20-29 MIN  Patient PCP Information       Provider PCP Type    Prosper Silva MD General            Reason for Visit / Chief Complaint: Hypertension (Patient has an elevated blood pressure. Patient states he has not taken any blood pressure medication in about 1 year. ) and Anxiety (Patient states he may have been experiencing some anxiety episodes lately )       Update PCP  Update Chief Complaint         History of Present Illness / Problem Focused Workflow     Patient presents to clinic for check up and medication refills. Tolerating medications well, but states he has not been on any medication in the past 1-2 years. He voices complaints of left hand numbness and tingling. Patient advised to try a wrist brace. He is also requesting medication for management of anxiety and depression. He does not have a PCP at present.       Review of Systems     Review of Systems   Constitutional:  Negative for chills, diaphoresis, fatigue and fever.   HENT:  Negative for congestion, ear pain, facial swelling and trouble swallowing.    Eyes:  Negative for pain, discharge, redness, itching and visual disturbance.   Respiratory:  Negative for apnea, cough, chest tightness, shortness of breath and wheezing.    Cardiovascular:  Negative for chest pain, palpitations and leg swelling.   Gastrointestinal:  Negative for abdominal pain, constipation, diarrhea, nausea and vomiting.   Genitourinary:  Negative for dysuria.   Skin:  Negative for rash.   Neurological:  Positive for weakness and numbness. Negative for dizziness and headaches.   Psychiatric/Behavioral:  The patient is nervous/anxious.         Medical / Social / Family History     Past Medical History:   Diagnosis Date    Hypertension     Rodney-Parkinson-White syndrome     Ablation x2 (age 3 and 14 at [facility])       Past Surgical History:   Procedure Laterality Date    ABLATION      Cardiac ablation x2 (Age 3 and Age 14 for WPW)       Social History    reports that he has been smoking vaping w/o nicotine. He has never used smokeless tobacco. He reports current alcohol use. He reports that he does not currently use drugs.    Family History  's family history is not on file.    Medications and Allergies     Medications  No outpatient medications have been marked as taking for the 5/17/25 encounter (Office Visit) with Perla Watkins FNP.       Allergies  Review of patient's allergies indicates:   Allergen Reactions    Shellfish containing products Rash and Shortness Of Breath       Physical Examination     Vitals:    05/17/25 0850   BP: (!) 150/98   Pulse: 77   Resp: 18   Temp: 98.2 °F (36.8 °C)     Physical Exam  Vitals and nursing note reviewed.   Constitutional:       Appearance: Normal appearance. He is normal weight.   HENT:      Head: Normocephalic.      Right Ear: Tympanic membrane, ear canal and external ear normal.      Left Ear: Tympanic membrane, ear canal and external ear normal.      Nose: Nose normal.      Mouth/Throat:      Lips: Pink.      Mouth: Mucous membranes are moist.      Pharynx: Oropharynx is clear.   Eyes:      General: Lids are normal.      Extraocular Movements: Extraocular movements intact.      Conjunctiva/sclera: Conjunctivae normal.      Pupils: Pupils are equal, round, and reactive to light.   Cardiovascular:      Rate and Rhythm: Normal rate and regular rhythm.      Pulses: Normal pulses.      Heart sounds: No murmur heard.  Pulmonary:      Effort: Pulmonary effort is normal. No respiratory distress.      Breath sounds: Normal breath sounds. No wheezing, rhonchi or rales.   Musculoskeletal:       Right wrist: Normal.      Left wrist: Normal.      Right hand: Normal. No swelling or deformity. Normal range of motion. Normal strength. Normal pulse.      Left hand: Normal.      Right lower leg: No edema.      Left lower leg: No edema.   Skin:     General: Skin is warm.      Coloration: Skin is not jaundiced.      Findings: No rash.   Neurological:      Mental Status: He is alert.   Psychiatric:         Attention and Perception: Attention and perception normal.         Mood and Affect: Mood and affect normal.         Speech: Speech normal.         Behavior: Behavior normal. Behavior is cooperative.         Thought Content: Thought content normal.         Cognition and Memory: Cognition and memory normal.         Judgment: Judgment normal.       Assessment and Plan (including Health Maintenance)      Problem List  Smart Sets  Document Outside HM   :    Health Maintenance Due   Topic Date Due    Lipid Panel  Never done    HPV Vaccines (2 - Male 2-dose series) 12/30/2014    Pneumococcal Vaccines (Age 0-49) (1 of 2 - PCV) Never done    TETANUS VACCINE  07/30/2022    COVID-19 Vaccine (3 - 2024-25 season) 09/01/2024       Problem List Items Addressed This Visit          Cardiac/Vascular    Hypertension - Primary    Rodney-Parkinson-White syndrome    Overview   Patient has had to have 2 cardiac ablations in the past - one when he was 3 years old and one at 14 years old. Ablations were performed at [facility]. No known family history of WPW.           Other Visit Diagnoses         Screening for lipid disorders          Screening for diabetes mellitus          Paresthesia and pain of left extremity                Health Maintenance Topics with due status: Not Due       Topic Last Completion Date    Influenza Vaccine Not Due    RSV Vaccine (Age 60+ and Pregnant patients) Not Due       No future appointments.       Signature:  BECK Hampton [facility]  [address] [address]  [phone]    Date of encounter: 5/17/25

## 2025-05-22 ENCOUNTER — CLINICAL SUPPORT (OUTPATIENT)
Dept: PRIMARY CARE CLINIC | Facility: CLINIC | Age: 25
End: 2025-05-22

## 2025-05-22 DIAGNOSIS — Z02.4 ENCOUNTER FOR COMMERCIAL DRIVER MEDICAL EXAMINATION (CDME): Primary | ICD-10-CM
